# Patient Record
Sex: FEMALE | Race: BLACK OR AFRICAN AMERICAN | NOT HISPANIC OR LATINO | URBAN - METROPOLITAN AREA
[De-identification: names, ages, dates, MRNs, and addresses within clinical notes are randomized per-mention and may not be internally consistent; named-entity substitution may affect disease eponyms.]

---

## 2020-05-28 ENCOUNTER — EMERGENCY (EMERGENCY)
Facility: HOSPITAL | Age: 30
LOS: 1 days | Discharge: HOME | End: 2020-05-28
Attending: EMERGENCY MEDICINE | Admitting: EMERGENCY MEDICINE
Payer: SELF-PAY

## 2020-05-28 VITALS
DIASTOLIC BLOOD PRESSURE: 73 MMHG | TEMPERATURE: 99 F | RESPIRATION RATE: 17 BRPM | SYSTOLIC BLOOD PRESSURE: 106 MMHG | OXYGEN SATURATION: 100 % | HEART RATE: 80 BPM

## 2020-05-28 VITALS
SYSTOLIC BLOOD PRESSURE: 110 MMHG | OXYGEN SATURATION: 100 % | DIASTOLIC BLOOD PRESSURE: 74 MMHG | TEMPERATURE: 99 F | HEART RATE: 94 BPM | RESPIRATION RATE: 17 BRPM

## 2020-05-28 DIAGNOSIS — K37 UNSPECIFIED APPENDICITIS: ICD-10-CM

## 2020-05-28 LAB
ALBUMIN SERPL ELPH-MCNC: 4.5 G/DL — SIGNIFICANT CHANGE UP (ref 3.3–5)
ALP SERPL-CCNC: 38 U/L — LOW (ref 40–120)
ALT FLD-CCNC: 8 U/L — SIGNIFICANT CHANGE UP (ref 4–33)
ANION GAP SERPL CALC-SCNC: 12 MMO/L — SIGNIFICANT CHANGE UP (ref 7–14)
APTT BLD: 29.1 SEC — SIGNIFICANT CHANGE UP (ref 27.5–36.3)
AST SERPL-CCNC: 12 U/L — SIGNIFICANT CHANGE UP (ref 4–32)
BASOPHILS # BLD AUTO: 0.02 K/UL — SIGNIFICANT CHANGE UP (ref 0–0.2)
BASOPHILS NFR BLD AUTO: 0.2 % — SIGNIFICANT CHANGE UP (ref 0–2)
BILIRUB SERPL-MCNC: 0.4 MG/DL — SIGNIFICANT CHANGE UP (ref 0.2–1.2)
BLD GP AB SCN SERPL QL: NEGATIVE — SIGNIFICANT CHANGE UP
BUN SERPL-MCNC: 5 MG/DL — LOW (ref 7–23)
CALCIUM SERPL-MCNC: 8.9 MG/DL — SIGNIFICANT CHANGE UP (ref 8.4–10.5)
CHLORIDE SERPL-SCNC: 105 MMOL/L — SIGNIFICANT CHANGE UP (ref 98–107)
CO2 SERPL-SCNC: 22 MMOL/L — SIGNIFICANT CHANGE UP (ref 22–31)
CREAT SERPL-MCNC: 0.67 MG/DL — SIGNIFICANT CHANGE UP (ref 0.5–1.3)
EOSINOPHIL # BLD AUTO: 0 K/UL — SIGNIFICANT CHANGE UP (ref 0–0.5)
EOSINOPHIL NFR BLD AUTO: 0 % — SIGNIFICANT CHANGE UP (ref 0–6)
GLUCOSE SERPL-MCNC: 92 MG/DL — SIGNIFICANT CHANGE UP (ref 70–99)
HCT VFR BLD CALC: 27.9 % — LOW (ref 34.5–45)
HGB BLD-MCNC: 8.2 G/DL — LOW (ref 11.5–15.5)
IMM GRANULOCYTES NFR BLD AUTO: 0.3 % — SIGNIFICANT CHANGE UP (ref 0–1.5)
INR BLD: 1.19 — HIGH (ref 0.88–1.17)
LYMPHOCYTES # BLD AUTO: 1.56 K/UL — SIGNIFICANT CHANGE UP (ref 1–3.3)
LYMPHOCYTES # BLD AUTO: 16.6 % — SIGNIFICANT CHANGE UP (ref 13–44)
MCHC RBC-ENTMCNC: 21.3 PG — LOW (ref 27–34)
MCHC RBC-ENTMCNC: 29.4 % — LOW (ref 32–36)
MCV RBC AUTO: 72.5 FL — LOW (ref 80–100)
MONOCYTES # BLD AUTO: 0.4 K/UL — SIGNIFICANT CHANGE UP (ref 0–0.9)
MONOCYTES NFR BLD AUTO: 4.2 % — SIGNIFICANT CHANGE UP (ref 2–14)
NEUTROPHILS # BLD AUTO: 7.41 K/UL — HIGH (ref 1.8–7.4)
NEUTROPHILS NFR BLD AUTO: 78.7 % — HIGH (ref 43–77)
NRBC # FLD: 0 K/UL — SIGNIFICANT CHANGE UP (ref 0–0)
PLATELET # BLD AUTO: 297 K/UL — SIGNIFICANT CHANGE UP (ref 150–400)
PMV BLD: 10.9 FL — SIGNIFICANT CHANGE UP (ref 7–13)
POTASSIUM SERPL-MCNC: 3.4 MMOL/L — LOW (ref 3.5–5.3)
POTASSIUM SERPL-SCNC: 3.4 MMOL/L — LOW (ref 3.5–5.3)
PROT SERPL-MCNC: 8 G/DL — SIGNIFICANT CHANGE UP (ref 6–8.3)
PROTHROM AB SERPL-ACNC: 13.6 SEC — HIGH (ref 9.8–13.1)
RBC # BLD: 3.85 M/UL — SIGNIFICANT CHANGE UP (ref 3.8–5.2)
RBC # FLD: 18.3 % — HIGH (ref 10.3–14.5)
RH IG SCN BLD-IMP: POSITIVE — SIGNIFICANT CHANGE UP
SARS-COV-2 RNA SPEC QL NAA+PROBE: DETECTED
SODIUM SERPL-SCNC: 139 MMOL/L — SIGNIFICANT CHANGE UP (ref 135–145)
WBC # BLD: 9.42 K/UL — SIGNIFICANT CHANGE UP (ref 3.8–10.5)
WBC # FLD AUTO: 9.42 K/UL — SIGNIFICANT CHANGE UP (ref 3.8–10.5)

## 2020-05-28 PROCEDURE — 74177 CT ABD & PELVIS W/CONTRAST: CPT | Mod: 26

## 2020-05-28 PROCEDURE — 99285 EMERGENCY DEPT VISIT HI MDM: CPT

## 2020-05-28 RX ORDER — MORPHINE SULFATE 50 MG/1
2 CAPSULE, EXTENDED RELEASE ORAL EVERY 6 HOURS
Refills: 0 | Status: DISCONTINUED | OUTPATIENT
Start: 2020-05-28 | End: 2020-05-28

## 2020-05-28 RX ORDER — ENOXAPARIN SODIUM 100 MG/ML
40 INJECTION SUBCUTANEOUS DAILY
Refills: 0 | Status: DISCONTINUED | OUTPATIENT
Start: 2020-05-28 | End: 2020-05-28

## 2020-05-28 RX ORDER — CEFOTETAN DISODIUM 1 G
VIAL (EA) INJECTION
Refills: 0 | Status: DISCONTINUED | OUTPATIENT
Start: 2020-05-28 | End: 2020-05-28

## 2020-05-28 RX ORDER — CEFOTETAN DISODIUM 1 G
2 VIAL (EA) INJECTION EVERY 12 HOURS
Refills: 0 | Status: DISCONTINUED | OUTPATIENT
Start: 2020-05-29 | End: 2020-05-28

## 2020-05-28 RX ORDER — SODIUM CHLORIDE 9 MG/ML
1000 INJECTION, SOLUTION INTRAVENOUS
Refills: 0 | Status: DISCONTINUED | OUTPATIENT
Start: 2020-05-28 | End: 2020-05-28

## 2020-05-28 RX ORDER — CEFOTETAN DISODIUM 1 G
2 VIAL (EA) INJECTION ONCE
Refills: 0 | Status: COMPLETED | OUTPATIENT
Start: 2020-05-28 | End: 2020-05-28

## 2020-05-28 RX ORDER — POTASSIUM CHLORIDE 20 MEQ
10 PACKET (EA) ORAL
Refills: 0 | Status: DISCONTINUED | OUTPATIENT
Start: 2020-05-28 | End: 2020-05-28

## 2020-05-28 RX ADMIN — Medication 100 GRAM(S): at 12:05

## 2020-05-28 RX ADMIN — Medication 100 MILLIEQUIVALENT(S): at 12:54

## 2020-05-28 NOTE — ED PROVIDER NOTE - NS ED MD DISPO ISOLATION TYPES
"Chief Complaint   Patient presents with     RECHECK     /66   Pulse 61   Wt 82 lb 1.6 oz (37.2 kg)   .2 cm (58.35\")     Soumya Fierro CMA    " None

## 2020-05-28 NOTE — H&P ADULT - NSHPLABSRESULTS_GEN_ALL_CORE
CT Abdomen and Pelvis w/ Oral Cont and w/ IV Cont (05.28.20 @ 10:54)  Acute appendicitis. Trace to small volume ascites without drainable fluid collection.

## 2020-05-28 NOTE — ED ADULT TRIAGE NOTE - SPO2 (%)
PAULINO WINSLOW   is a   57   year old    female who is being seen in consultation at the request of   SAMUEL LAGUERRE   for GERD. Has GERD takes rabeprazole 20mg daily, she has taken this for many years. Has GERD frequently. She takes Mylanta frequently or mixes baking soda with water.She mostly cereals because that is the only thing that doesn't cause GERD. She has a tempurdic bed and can sleep with her head raised up. She is wheezing a lot at night. She coughs up bile at night. Last year was hospitalized twice for pneumonia and the influenza. Has been having problems swallowing foods, she feels like she needs to be dilated again. She has osteoporosis started Reclast infusion. She had an EGD 10/2014-with dilatation for esophageal ring, gastric fundic polyp, was tx for esophageal candidiasis with diflucan.  She has scleroderma managed by Dr. Laguerre and Dr. Elliott at [facility] (likely will start MTX). Had a colonoscopy 4/8/2015- diverticulosis. Has history of iron deficiency anemia, will obtain recent labs we only have labs from 1/28/15 hgb-9.9, hct-30.2. 3/19 iron sat-7%.  Capsule endoscopy 10/12/15-numerous AVM's in stomach and small intestine some were bleeding. She was referred to Dr. Silvano Padilla 11/15/15 but didn't need to have deep single balloon enteroscopy. Has a BM daily, McConnells stool scale type 3-4, no blood. 
100

## 2020-05-28 NOTE — ED PROVIDER NOTE - CLINICAL SUMMARY MEDICAL DECISION MAKING FREE TEXT BOX
29 y/o F presenting with reported confirmed appy. Will obtain pre-op labs, repeat imaging to confirm. Likely admit to surgery - Markell Obrien, DO PGY-1

## 2020-05-28 NOTE — H&P ADULT - HISTORY OF PRESENT ILLNESS
30yF that presented to [facility] the night before admission with 3D of epigastric pain that radiates to the RLQ. The pain was associated with mild nausea with food. The patient received antibiotics at [facility] but was uncertain about getting surgery last night and went home. Patient presented to [facility] ED the morning of admission with reduced RLQ pain, but wanted to get a second opinion and get definitive management. She does not report any emesis, constipation, diarrhea, HA, CP, SOB, dizziness/light-headedness.

## 2020-05-28 NOTE — ED ADULT NURSE NOTE - NSIMPLEMENTINTERV_GEN_ALL_ED
Implemented All Universal Safety Interventions:  [place] to call system. Call bell, personal items and telephone within reach. Instruct patient to call for assistance. Room bathroom lighting operational. Non-slip footwear when patient is off stretcher. Physically safe environment: no spills, clutter or unnecessary equipment. Stretcher in lowest position, wheels locked, appropriate side rails in place.

## 2020-05-28 NOTE — ED PROVIDER NOTE - NS ED ROS FT
CONSTITUTIONAL: No fevers, no chills, no lightheadedness, no dizziness  Eyes: no visual changes  Ears: no ear drainage, no ear pain  Nose: no nasal congestion  Mouth/Throat: no sore throat  CV: No chest pain, no palpitations  PULM: No SOB, no cough  GI: +abd pain, no n/v/d  : no dysuria, no hematuria  SKIN: no rashes.  NEURO: no headache, no focal weakness or numbness  LYMPH/VASC: no LE swelling

## 2020-05-28 NOTE — ED PROVIDER NOTE - NSFOLLOWUPCLINICS_GEN_ALL_ED_FT
[facility]  General Surgery  [address] [place] [address] - [address] [place] [address]  Phone: [phone]  Fax:   Follow Up Time:

## 2020-05-28 NOTE — ED PROVIDER NOTE - PATIENT PORTAL LINK FT
You can access the FollowMyHealth Patient Portal offered by [facility] by registering at the following website: [email or web address]. By joining [employer]’s FollowMyHealth portal, you will also be able to view your health information using other applications (apps) compatible with our system.

## 2020-05-28 NOTE — ED ADULT NURSE NOTE - OBJECTIVE STATEMENT
Patient states she went to urgent care yesterday and was referred to the ER to r/o appendicitis.  She was seen at [facility] and was told she needed surgery but signed out of hospital. Patient came to [facility] ER to be seen for the same problem. No immediate distress noted. Refuses pain medication at the moment.

## 2020-05-28 NOTE — CHART NOTE - NSCHARTNOTEFT_GEN_A_CORE
Patient was tested positive for COVID-19, but asymptomatic. She confirmed that her sister whom she had close contact with was tested positive recently. Given that her symptom responded to antibiotic, absence of systematic sign of infection, absence of leukocytosis, it was determined that patient may be better served with non-operative management with continuing antibiotic and possible interval appendectomy when her COVID infection resolves. Her COVID results and non-operative management option were communicated to the patient. She expressed understanding and is comfortable with continuing antibiotic at home and scheduling follow up appointment for interval appendectomy. Precautions to return to ER were given to patient, and scripts for 7 days of Augmentin will be sent to patient's pharmacy to be picked up. Plan was also communicated to the ER staffs

## 2020-05-28 NOTE — ED PROVIDER NOTE - PROGRESS NOTE DETAILS
Given patient's COVID+ status, surgery recs outpatient PO abx and f/u with surgery clinic. Discussed with patient, given strict return precautions. - Markell Obrien, DO PGY-1

## 2020-05-28 NOTE — H&P ADULT - NSHPPHYSICALEXAM_GEN_ALL_CORE
Gen: NAD, awake, alert, and responding to questions appropriately  HEENT: PERRL, EOMI. Oropharynx clear.  Neck: Supple, no JVD or tracheal deviation.  Resp: Unlabored, clear to auscultation bilaterally.  Cardiac: Regular S1S2.  Abd: Soft, tender in the RLQ, nondistended. No palpable masses.  Ext: No edema or cyanosis.  Neuro: Alert and oriented x3. Cranial nerves II-XII intact. No focal deficit identified.

## 2020-05-28 NOTE — ED PROVIDER NOTE - OBJECTIVE STATEMENT
31 y/o F with no PMH presenting with abd pain. Patient states she has abd x2 days. Was seen at OSH last night with confirmed appendicitis on CT, however patient signed out AMA. Patient denies fevers, n/v/d, urinary complaints.

## 2020-05-28 NOTE — ED PROVIDER NOTE - ATTENDING CONTRIBUTION TO CARE
Dr. Corona: 31 yo female with no sig PMH in ED with continued abdominal pain and known appendicitis on CT performed yesterday at [facility].  Pt signed out AMA before surgery because she was nervous.  Comes to ED today with continued symptoms.  No fever.  Previously had N/V but not currently.  On exam pt overall well appearing, in NAD, heart RRR, lungs CTAB, abd TTP in RLQ but without rebound or guarding, extremities without swelling, strength 5/5 in all extremities and skin without rash.

## 2020-05-28 NOTE — ED PROVIDER NOTE - PHYSICAL EXAMINATION
gen: well appearing  Mentation: AAO x 3  psych: mood appropriate  ENT: airway patent  Eyes: conjunctivae clear bilaterally  Cardio: RRR, no m/r/g  Resp: normal BS b/l  GI: +RLQ tenderness, +Rovsings; nondistended  : no CVA tenderness  Neuro: sensation and motor function intact  Skin: No evidence of rash  MSK: normal movement of all extremities  Lymph/Vasc: no LE edema

## 2020-05-28 NOTE — H&P ADULT - ASSESSMENT
30yF p/w uncomplicated, acute appendicities p/w 3D of epigastric pain migrating to mid to RLQ not associated with N/V. Patient received antibiotics at OSH. CT scan revealing acute appendicitis.  - Admit to Dr. Romero  - IV cefotetan  - pain control with IV tylenol, and IV morphine prn  - NPO/IVF  - Added-on for laparoscopic appendicitis, possible open    B team surgery,  [id number]

## 2020-05-28 NOTE — ED PROVIDER NOTE - NSFOLLOWUPINSTRUCTIONS_ED_ALL_ED_FT
You were evaluated in the Emergency Department for abdominal pain.  You were evaluated and examined by a physician, and you had labs, CT.      Based on your evaluation: you have appendicitis    There are no signs of emergency conditions requiring admission to the hospital on today's workup.  Based on the evaluation, a presumptive diagnosis was made, however, further evaluation may be required by your primary care physician or a specialist for a more definitive diagnosis.  Therefore, please follow-up as directed or return to the Emergency Department if your symptoms change or worsen.    We recommend that you:  1. See your primary care physician within the next 72 hours for follow up.  Bring a copy of your discharge paperwork (including any test results) to your doctor.  2. Follow up with general surgery.      *** Return immediately if you have fevers, worsening pain. severe nausea and/or vomiting, or any other new/concerning symptoms. ***

## 2020-06-02 RX ORDER — ASCORBIC ACID 60 MG
1 TABLET,CHEWABLE ORAL
Qty: 0 | Refills: 0 | DISCHARGE

## 2022-02-01 ENCOUNTER — OUTPATIENT (OUTPATIENT)
Dept: OUTPATIENT SERVICES | Facility: HOSPITAL | Age: 32
LOS: 1 days | End: 2022-02-01

## 2022-02-01 DIAGNOSIS — Z20.822 CONTACT WITH AND (SUSPECTED) EXPOSURE TO COVID-19: ICD-10-CM

## 2022-02-01 PROBLEM — Z78.9 OTHER SPECIFIED HEALTH STATUS: Chronic | Status: ACTIVE | Noted: 2020-05-28

## 2022-02-01 LAB — SARS-COV-2 RNA SPEC QL NAA+PROBE: SIGNIFICANT CHANGE UP
